# Patient Record
Sex: FEMALE | Race: WHITE | Employment: UNEMPLOYED | URBAN - METROPOLITAN AREA
[De-identification: names, ages, dates, MRNs, and addresses within clinical notes are randomized per-mention and may not be internally consistent; named-entity substitution may affect disease eponyms.]

---

## 2024-08-26 DIAGNOSIS — M41.9 SCOLIOSIS, UNSPECIFIED SCOLIOSIS TYPE, UNSPECIFIED SPINAL REGION: ICD-10-CM

## 2024-08-26 DIAGNOSIS — M54.16 LUMBAR RADICULOPATHY: ICD-10-CM

## 2024-08-26 RX ORDER — GABAPENTIN 300 MG/1
900 CAPSULE ORAL 3 TIMES DAILY
Qty: 270 CAPSULE | Refills: 0 | Status: SHIPPED | OUTPATIENT
Start: 2024-08-26

## 2024-08-27 ENCOUNTER — OFFICE VISIT (OUTPATIENT)
Dept: FAMILY MEDICINE CLINIC | Facility: CLINIC | Age: 54
End: 2024-08-27
Payer: COMMERCIAL

## 2024-08-27 VITALS
BODY MASS INDEX: 33.95 KG/M2 | WEIGHT: 224 LBS | DIASTOLIC BLOOD PRESSURE: 80 MMHG | OXYGEN SATURATION: 99 % | TEMPERATURE: 98.8 F | SYSTOLIC BLOOD PRESSURE: 128 MMHG | HEIGHT: 68 IN | HEART RATE: 78 BPM

## 2024-08-27 DIAGNOSIS — R21 MALAR RASH: ICD-10-CM

## 2024-08-27 DIAGNOSIS — M25.50 ARTHRALGIA, UNSPECIFIED JOINT: Primary | ICD-10-CM

## 2024-08-27 DIAGNOSIS — Z00.00 ROUTINE ADULT HEALTH MAINTENANCE: ICD-10-CM

## 2024-08-27 PROCEDURE — 99396 PREV VISIT EST AGE 40-64: CPT | Performed by: FAMILY MEDICINE

## 2024-08-27 PROCEDURE — 99213 OFFICE O/P EST LOW 20 MIN: CPT | Performed by: FAMILY MEDICINE

## 2024-08-30 ENCOUNTER — TELEMEDICINE (OUTPATIENT)
Dept: BEHAVIORAL/MENTAL HEALTH CLINIC | Facility: CLINIC | Age: 54
End: 2024-08-30
Payer: COMMERCIAL

## 2024-08-30 DIAGNOSIS — F41.9 ANXIETY AND DEPRESSION: Primary | ICD-10-CM

## 2024-08-30 DIAGNOSIS — F32.A ANXIETY AND DEPRESSION: Primary | ICD-10-CM

## 2024-08-30 PROCEDURE — 90853 GROUP PSYCHOTHERAPY: CPT | Performed by: COUNSELOR

## 2024-08-30 NOTE — PSYCH
Virtual Regular Visit    Verification of patient location:    Patient is located at Home in the following state in which I hold an active license [place]      Assessment/Plan:    Problem List Items Addressed This Visit    None  Visit Diagnoses       Anxiety and depression    -  Primary            Goals addressed in session: Goal 1          Reason for visit is No chief complaint on file.       Encounter provider Paola Calderon LPC      Recent Visits  Date Type Provider Dept   08/27/24 Office Visit Sandeep Smith MD [facility]   Showing recent visits within past 7 days and meeting all other requirements  Future Appointments  No visits were found meeting these conditions.  Showing future appointments within next 150 days and meeting all other requirements       The patient was identified by name and date of birth. MORELIA Tena was informed that this is a telemedicine visit and that the visit is being conducted throughthe [employer] Teams platform. Cuca agrees to proceed..  My office door was closed. The patient was notified the following individuals were present in the room other group members.  Cuca acknowledged consent and understanding of privacy and security of the video platform. The patient has agreed to participate and understands they can discontinue the visit at any time.    Patient is aware this is a billable service.     Subjective  MORELIA Tena is a 54 y.o. adult  .    Data: Kenyetta attended the Relaxation group and was guided through tapping on anxiety, diaphragmatic breathing, other Vagus nerve stimulation techniques, and progressive muscle relaxation. Furthermore, attendees were guided through a body scan, Mindfulness-based meditation for a Safe Place to Deal with Pain, Stopping Obsessive Thoughts About the Past, Peaceful Waves relaxation with imagery,  Finding Calm and Equanimity, and ending with affirmations for self-compassion, letting go with gratitude, and celebrating  others' success. EFT, Mindfulness-based, and Somatic approaches were used to achieve the deep state of relaxation and to maintain the serene environment for the exercise's purpose.      No alcohol or drug problems were addressed in the session.     No safety plan was indicated due to no safety issues at the moment.      Assessment: Kenyetta was in normal/euthymic mood and in congruence with broad affect, followed through guidance and completed the exercises well, showed willingness to continue learning and practicing self-care in weekly Relaxation Group as well as individual therapy.      Plan: Kenyetta will practice deep breathing and other techniques based on each occasion to counter stress and will attend the next group to be able to take part in further guided exercises on chosen topics to build on the techniques learned in the beginning.    HPI     Past Medical History:   Diagnosis Date    Allergic     Anxiety     Arthritis     Asthma     Depression     GERD (gastroesophageal reflux disease)     Headache(784.0)     HPV (human papilloma virus) infection     Liver disease 7/26/16    Obesity     Osteoarthritis        Past Surgical History:   Procedure Laterality Date    BREAST BIOPSY Left 04/21/2021    benign    US GUIDED BREAST BIOPSY LEFT COMPLETE Left 4/21/2021       Current Outpatient Medications   Medication Sig Dispense Refill    albuterol (PROVENTIL HFA,VENTOLIN HFA) 90 mcg/act inhaler INHALE 2 PUFFS EVERY 4 HOURS AS NEEDED FOR WHEEZING 8.5 g 2    Atogepant 60 MG TABS Take 60 mg PO daily 90 tablet 1    carisoprodol (SOMA) 350 mg tablet TAKE 1 TABLET BY MOUTH 3 TIMES A DAY 90 tablet 0    DULoxetine (CYMBALTA) 30 mg delayed release capsule Take 1 tab daily together with a 60mg tab to equal 90mg daily. 90 capsule 1    DULoxetine (CYMBALTA) 60 mg delayed release capsule Take 1 tab daily together with a 30mg tab to equal 90mg daily. 30 capsule 1    fluticasone (FLONASE) 50 mcg/act nasal spray 2 sprays into each  nostril daily (Patient not taking: Reported on 8/1/2024) 15.8 mL 0    Fluticasone-Salmeterol (Advair Diskus) 500-50 mcg/dose inhaler Inhale 1 puff 2 (two) times a day Rinse mouth after use. 60 blister 1    gabapentin (NEURONTIN) 300 mg capsule TAKE 3 CAPSULES BY MOUTH THREE TIMES A  capsule 0    magnesium Oxide (MAG-OX) 400 mg TABS Take 1 tablet (400 mg total) by mouth 2 (two) times a day 180 tablet 3    meloxicam (Mobic) 15 mg tablet Take 1 tablet (15 mg total) by mouth daily 30 tablet 5    methocarbamol (ROBAXIN) 500 mg tablet TAKE ONE TABLET BY MOUTH DAILY AT BEDTIME AS NEEDED FOR MUSCLE SPASMS 30 tablet 0    montelukast (SINGULAIR) 10 mg tablet Take 1 tablet (10 mg total) by mouth daily at bedtime 90 tablet 1    Multiple Vitamin (MULTI-VITAMIN DAILY) TABS Take by mouth (Patient not taking: Reported on 8/1/2024)      naratriptan (AMERGE) 1 MG TABS Take 1 tablet daily beginning 2 days prior to start of menses; then continue daily x 5 days total AND Use 1 tablet as needed for a migraine headache; may repeat once in 4 hours if needed 36 tablet 1    pantoprazole (PROTONIX) 40 mg tablet Take 1 tablet (40 mg total) by mouth daily (Patient not taking: Reported on 8/1/2024) 30 tablet 5    polyethylene glycol (GLYCOLAX) 17 GM/SCOOP powder Take 17 g by mouth daily as needed (constipation) 578 g 1    prochlorperazine (COMPAZINE) 10 mg tablet Take 1 tablet (10 mg total) by mouth every 6 (six) hours as needed (Migraine) 36 tablet 1    Riboflavin 400 MG TABS Take 1 tablet (400 mg total) by mouth daily 90 tablet 3     No current facility-administered medications for this visit.        No Known Allergies    Review of Systems    Video Exam    There were no vitals filed for this visit.    Physical Exam     08/30/24  Start Time: 0400  Stop Time: 0500  Total Visit Time: 60 minutes

## 2024-08-30 NOTE — PROGRESS NOTES
Assessment/Plan:    53 y/o woman with: facial bilateral cheek rash and persistent arthralgias with stiffness along with annual well visit. Will continue meds. And refer to second opinion from Rheumatology per pt's request. Will check labs. Discussed supportive care and return parameters.     Regarding Annual well visit. Discussed various safety and health maintenance issues including healthy diet like the Mediterranean diet, exercise, ample sleep, stress reduction, and healthy weight as tolerated. Discussed supportive care and return parameters.     No problem-specific Assessment & Plan notes found for this encounter.       Diagnoses and all orders for this visit:    Arthralgia, unspecified joint  -     Ambulatory Referral to Rheumatology; Future  -     CBC and differential; Future  -     Comprehensive metabolic panel; Future  -     TSH, 3rd generation with Free T4 reflex; Future  -     Lipid Panel with Direct LDL reflex; Future  -     KENDELL w/Reflex if Positive; Future  -     C-reactive protein; Future    Malar rash  -     Ambulatory Referral to Rheumatology; Future  -     CBC and differential; Future  -     Comprehensive metabolic panel; Future  -     TSH, 3rd generation with Free T4 reflex; Future  -     Lipid Panel with Direct LDL reflex; Future  -     KENDELL w/Reflex if Positive; Future  -     C-reactive protein; Future    Routine adult health maintenance  -     CBC and differential; Future  -     Comprehensive metabolic panel; Future  -     TSH, 3rd generation with Free T4 reflex; Future  -     Lipid Panel with Direct LDL reflex; Future  -     KENDELL w/Reflex if Positive; Future  -     C-reactive protein; Future          Subjective:     Chief Complaint   Patient presents with    Well Check     Annual physical due. Patient has some concerns about diagnosis due to spine pain, continuous issue. Patient is due for osteo screening and depression follow up plan, please discuss.No further concerns, ng        Patient ID: Cuca  "Darinel is a 54 y.o. adult.    Patient is a 53 y/o woman who presents for follow-up on facial bilateral cheek rash and persistent arthralgias with stiffness no fevers chills nausea or vomiting. Pt also here for annual well visit admits being active, eats well, sleeps well at times no other health maintenance issues.        The following portions of the patient's history were reviewed and updated as appropriate: allergies, current medications, past family history, past medical history, past social history, past surgical history and problem list.    Review of Systems   Constitutional: Negative.    HENT: Negative.     Eyes: Negative.    Respiratory: Negative.     Cardiovascular: Negative.    Gastrointestinal: Negative.    Endocrine: Negative.    Genitourinary: Negative.    Musculoskeletal:  Positive for arthralgias and myalgias.   Skin:  Positive for rash.   Allergic/Immunologic: Negative.    Neurological: Negative.    Hematological: Negative.    Psychiatric/Behavioral: Negative.     All other systems reviewed and are negative.        Objective:      /80 (BP Location: Left arm, Patient Position: Sitting, Cuff Size: Large)   Pulse 78   Temp 98.8 °F (37.1 °C) (Tympanic)   Ht 5' 8\" (1.727 m)   Wt 102 kg (224 lb)   SpO2 99%   BMI 34.06 kg/m²          Physical Exam  Constitutional:       Appearance: Cuca is well-developed.   HENT:      Head: Atraumatic.      Right Ear: External ear normal.      Left Ear: External ear normal.   Eyes:      Extraocular Movements: EOM normal.      Conjunctiva/sclera: Conjunctivae normal.      Pupils: Pupils are equal, round, and reactive to light.   Cardiovascular:      Rate and Rhythm: Normal rate and regular rhythm.      Heart sounds: Normal heart sounds.   Pulmonary:      Effort: Pulmonary effort is normal. No respiratory distress.      Breath sounds: Normal breath sounds.   Abdominal:      General: There is no distension.      Palpations: Abdomen is soft.      Tenderness: There " is no abdominal tenderness. There is no guarding or rebound.   Musculoskeletal:         General: Normal range of motion.      Cervical back: Normal range of motion.   Skin:     General: Skin is warm and dry.   Neurological:      Mental Status: Cuca is alert and oriented to person, place, and time.      Cranial Nerves: No cranial nerve deficit.      Sensory: No sensory deficit.   Psychiatric:         Mood and Affect: Mood and affect normal.         Behavior: Behavior normal.         Thought Content: Thought content normal.         Judgment: Judgment normal.